# Patient Record
Sex: FEMALE | Race: WHITE | NOT HISPANIC OR LATINO
[De-identification: names, ages, dates, MRNs, and addresses within clinical notes are randomized per-mention and may not be internally consistent; named-entity substitution may affect disease eponyms.]

---

## 2020-02-18 ENCOUNTER — FORM ENCOUNTER (OUTPATIENT)
Age: 75
End: 2020-02-18

## 2021-02-24 ENCOUNTER — APPOINTMENT (OUTPATIENT)
Dept: BREAST CENTER | Facility: CLINIC | Age: 76
End: 2021-02-24

## 2021-03-15 PROBLEM — Z00.00 ENCOUNTER FOR PREVENTIVE HEALTH EXAMINATION: Status: ACTIVE | Noted: 2021-03-15

## 2021-04-05 DIAGNOSIS — Z86.79 PERSONAL HISTORY OF OTHER DISEASES OF THE CIRCULATORY SYSTEM: ICD-10-CM

## 2021-04-05 DIAGNOSIS — N63.10 UNSPECIFIED LUMP IN THE RIGHT BREAST, UNSPECIFIED QUADRANT: ICD-10-CM

## 2021-04-05 DIAGNOSIS — Z80.7 FAMILY HISTORY OF OTHER MALIGNANT NEOPLASMS OF LYMPHOID, HEMATOPOIETIC AND RELATED TISSUES: ICD-10-CM

## 2021-04-05 DIAGNOSIS — Z80.3 FAMILY HISTORY OF MALIGNANT NEOPLASM OF BREAST: ICD-10-CM

## 2021-04-05 DIAGNOSIS — Z87.891 PERSONAL HISTORY OF NICOTINE DEPENDENCE: ICD-10-CM

## 2021-04-07 ENCOUNTER — APPOINTMENT (OUTPATIENT)
Dept: BREAST CENTER | Facility: CLINIC | Age: 76
End: 2021-04-07
Payer: MEDICARE

## 2021-04-07 VITALS
SYSTOLIC BLOOD PRESSURE: 142 MMHG | BODY MASS INDEX: 24.99 KG/M2 | HEIGHT: 65 IN | DIASTOLIC BLOOD PRESSURE: 82 MMHG | WEIGHT: 150 LBS

## 2021-04-07 PROCEDURE — 99072 ADDL SUPL MATRL&STAF TM PHE: CPT

## 2021-04-07 PROCEDURE — 99213 OFFICE O/P EST LOW 20 MIN: CPT

## 2021-04-07 RX ORDER — METOPROLOL TARTRATE 50 MG/1
50 TABLET, FILM COATED ORAL
Refills: 0 | Status: ACTIVE | COMMUNITY

## 2021-04-07 RX ORDER — ATORVASTATIN CALCIUM 10 MG/1
10 TABLET, FILM COATED ORAL
Refills: 0 | Status: ACTIVE | COMMUNITY

## 2021-04-07 RX ORDER — LISINOPRIL 20 MG/1
20 TABLET ORAL
Refills: 0 | Status: ACTIVE | COMMUNITY

## 2021-04-07 NOTE — REVIEW OF SYSTEMS
[Constipation] : constipation [As Noted in HPI] : as noted in HPI [Joint Stiffness] : joint stiffness [Negative] : Heme/Lymph

## 2021-04-07 NOTE — ASSESSMENT
[FreeTextEntry1] : The patient is a 75-year-old nulliparous postmenopausal white female.  She underwent menopause at age 58 and does use Estrace cream.  She has a niece who had breast cancer at age 43.  Her brother had mantle cell lymphoma.  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  She had a right breast 3:00 stable density which has been consistent with an oil cyst which has been followed.  On exam today I cannot feel any suspicious densities in either breast.  She underwent her last mammography and ultrasound at [facility] on April 6, 2021 and I reviewed the films which look unchanged and negative with a stable right breast 3:00 complex cyst and 9:00 intramammary lymph node.  If the official report is unchanged, I would like to see her again in 1 year around April 2022 and her next bilateral mammography and ultrasound will be due at that time.

## 2021-04-07 NOTE — PAST MEDICAL HISTORY
[Postmenopausal] : The patient is postmenopausal [Menarche Age ____] : age at menarche was [unfilled] [Menopause Age____] : age at menopause was [unfilled] [Total Preg ___] : G[unfilled] [History of Hormone Replacement Treatment] : has no history of hormone replacement treatment

## 2021-04-07 NOTE — PHYSICAL EXAM
[Normocephalic] : normocephalic [Atraumatic] : atraumatic [EOMI] : extra ocular movement intact [Supple] : supple [No Supraclavicular Adenopathy] : no supraclavicular adenopathy [No Cervical Adenopathy] : no cervical adenopathy [Examined in the supine and seated position] : examined in the supine and seated position [No dominant masses] : no dominant masses in right breast  [No dominant masses] : no dominant masses left breast [No Nipple Retraction] : no left nipple retraction [No Nipple Discharge] : no left nipple discharge [Breast Mass Right Breast ___cm] : no masses [Breast Mass Left Breast ___cm] : no masses [Breast Nipple Inversion] : nipples not inverted [Breast Nipple Retraction] : nipples not retracted [Breast Nipple Flattening] : nipples not flattened [Breast Nipple Fissures] : nipples not fissured [Breast Abnormal Lactation (Galactorrhea)] : no galactorrhea [Breast Abnormal Secretion Bloody Fluid] : no bloody discharge [Breast Abnormal Secretion Serous Fluid] : no serous discharge [Breast Abnormal Secretion Opalescent Fluid] : no milky discharge [No Axillary Lymphadenopathy] : no left axillary lymphadenopathy [No Edema] : no edema [No Rashes] : no rashes [No Ulceration] : no ulceration [de-identified] : On exam, the patient has ptotic C-cup breasts.  I cannot feel any suspicious densities in either breast.  She has no axillary, supraclavicular, or cervical adenopathy.

## 2021-04-07 NOTE — REASON FOR VISIT
[Follow-Up: _____] : a [unfilled] follow-up visit [FreeTextEntry1] : The patient comes in for routine follow-up with a history of some fibrocystic mastopathy

## 2021-04-07 NOTE — HISTORY OF PRESENT ILLNESS
[FreeTextEntry1] : The patient is a 75-year-old nulliparous postmenopausal white female.  She underwent menopause at age 58 and does use Estrace cream.  She has a niece who had breast cancer at age 43.  Her brother had mantle cell lymphoma.  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  She had a right breast 3:00 stable density which has been consistent with an oil cyst which has been followed.  She comes in for routine yearly exam and continues to get yearly mammography and ultrasound.

## 2021-04-23 ENCOUNTER — TRANSCRIPTION ENCOUNTER (OUTPATIENT)
Age: 76
End: 2021-04-23

## 2022-04-05 ENCOUNTER — RESULT REVIEW (OUTPATIENT)
Age: 77
End: 2022-04-05

## 2022-04-11 ENCOUNTER — RESULT REVIEW (OUTPATIENT)
Age: 77
End: 2022-04-11

## 2022-04-20 ENCOUNTER — APPOINTMENT (OUTPATIENT)
Dept: BREAST CENTER | Facility: CLINIC | Age: 77
End: 2022-04-20
Payer: MEDICARE

## 2022-04-20 VITALS
HEIGHT: 65.5 IN | OXYGEN SATURATION: 98 % | WEIGHT: 150 LBS | DIASTOLIC BLOOD PRESSURE: 92 MMHG | SYSTOLIC BLOOD PRESSURE: 144 MMHG | HEART RATE: 85 BPM | BODY MASS INDEX: 24.69 KG/M2

## 2022-04-20 DIAGNOSIS — R92.2 INCONCLUSIVE MAMMOGRAM: ICD-10-CM

## 2022-04-20 PROCEDURE — 99213 OFFICE O/P EST LOW 20 MIN: CPT

## 2022-04-20 NOTE — HISTORY OF PRESENT ILLNESS
[FreeTextEntry1] : The patient is a 76-year-old nulliparous postmenopausal white female.  She underwent menopause at age 58 and does use Estrace cream.  She has a niece who had breast cancer at age 43.  Her brother had mantle cell lymphoma.  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  She had a right breast 3:00 stable density which has been consistent with an oil cyst which has been followed.  She comes in for routine yearly exam and continues to get yearly mammography and ultrasound.

## 2022-04-20 NOTE — PHYSICAL EXAM
[Normocephalic] : normocephalic [Atraumatic] : atraumatic [EOMI] : extra ocular movement intact [Supple] : supple [No Supraclavicular Adenopathy] : no supraclavicular adenopathy [No Cervical Adenopathy] : no cervical adenopathy [Examined in the supine and seated position] : examined in the supine and seated position [No dominant masses] : no dominant masses in right breast  [No dominant masses] : no dominant masses left breast [No Nipple Retraction] : no left nipple retraction [Breast Mass Right Breast ___cm] : no masses [No Nipple Discharge] : no left nipple discharge [Breast Mass Left Breast ___cm] : no masses [No Axillary Lymphadenopathy] : no right axillary lymphadenopathy [No Edema] : no edema [No Rashes] : no rashes [No Ulceration] : no ulceration [Breast Nipple Inversion] : nipples not inverted [Breast Nipple Retraction] : nipples not retracted [Breast Nipple Flattening] : nipples not flattened [Breast Nipple Fissures] : nipples not fissured [Breast Abnormal Lactation (Galactorrhea)] : no galactorrhea [Breast Abnormal Secretion Bloody Fluid] : no bloody discharge [Breast Abnormal Secretion Serous Fluid] : no serous discharge [Breast Abnormal Secretion Opalescent Fluid] : no milky discharge [de-identified] : On exam, the patient has ptotic C-cup breasts.  I cannot feel any suspicious densities in either breast.  She has no axillary, supraclavicular, or cervical adenopathy.

## 2022-04-20 NOTE — ASSESSMENT
[FreeTextEntry1] : The patient is a 76-year-old nulliparous postmenopausal white female.  She underwent menopause at age 58 and does use Estrace cream.  She has a niece who had breast cancer at age 43.  Her brother had mantle cell lymphoma.  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  She had a right breast 3:00 stable density which has been consistent with an oil cyst which has been followed.  On exam today, I cannot feel any suspicious densities in either breast.  She underwent her last mammography and ultrasound which was reviewed from April 11, 2022 performed at [facility] which was unchanged and negative with a stable right breast 3:00 hypoechoic density.  She was reassured and I would like to see her again in 1 year around April 2023 and her next bilateral mammography and ultrasound will be due at that time and she was given prescriptions.

## 2022-12-08 ENCOUNTER — OFFICE (OUTPATIENT)
Dept: URBAN - METROPOLITAN AREA CLINIC 29 | Facility: CLINIC | Age: 77
Setting detail: OPHTHALMOLOGY
End: 2022-12-08
Payer: MEDICARE

## 2022-12-08 DIAGNOSIS — H25.13: ICD-10-CM

## 2022-12-08 DIAGNOSIS — H02.834: ICD-10-CM

## 2022-12-08 DIAGNOSIS — H02.831: ICD-10-CM

## 2022-12-08 PROCEDURE — 92014 COMPRE OPH EXAM EST PT 1/>: CPT | Performed by: OPHTHALMOLOGY

## 2022-12-08 ASSESSMENT — REFRACTION_MANIFEST
OD_AXIS: 175
OS_VA1: 20/25+
OS_AXIS: 175
OD_SPHERE: PLANO
OS_CYLINDER: +1.25
OD_CYLINDER: +1.25
OD_VA1: 20/20
OS_SPHERE: PLANO

## 2022-12-08 ASSESSMENT — CONFRONTATIONAL VISUAL FIELD TEST (CVF)
OS_FINDINGS: FULL
OD_FINDINGS: FULL

## 2022-12-08 ASSESSMENT — TONOMETRY
OS_IOP_MMHG: 16
OD_IOP_MMHG: 15

## 2022-12-08 ASSESSMENT — REFRACTION_AUTOREFRACTION
OS_AXIS: 004
OS_SPHERE: PLANO
OD_SPHERE: PLANO
OD_CYLINDER: +2.00
OD_AXIS: 006
OS_CYLINDER: +2.25

## 2022-12-08 ASSESSMENT — REFRACTION_CURRENTRX
OS_VPRISM_DIRECTION: SV
OS_SPHERE: +2.75
OD_OVR_VA: 20/
OD_SPHERE: +2.75
OS_OVR_VA: 20/
OD_VPRISM_DIRECTION: SV

## 2022-12-08 ASSESSMENT — VISUAL ACUITY
OD_BCVA: 20/30
OS_BCVA: 20/30-1

## 2022-12-08 ASSESSMENT — LID POSITION - DERMATOCHALASIS
OD_DERMATOCHALASIS: RUL 2+
OS_DERMATOCHALASIS: LUL 2+

## 2023-04-19 ENCOUNTER — RESULT REVIEW (OUTPATIENT)
Age: 78
End: 2023-04-19

## 2023-04-19 ENCOUNTER — APPOINTMENT (OUTPATIENT)
Dept: BREAST CENTER | Facility: CLINIC | Age: 78
End: 2023-04-19
Payer: MEDICARE

## 2023-04-19 VITALS
BODY MASS INDEX: 25.52 KG/M2 | WEIGHT: 155 LBS | SYSTOLIC BLOOD PRESSURE: 135 MMHG | HEART RATE: 83 BPM | TEMPERATURE: 97.4 F | DIASTOLIC BLOOD PRESSURE: 78 MMHG | HEIGHT: 65.5 IN | OXYGEN SATURATION: 98 %

## 2023-04-19 PROCEDURE — 99213 OFFICE O/P EST LOW 20 MIN: CPT

## 2023-04-19 NOTE — HISTORY OF PRESENT ILLNESS
[FreeTextEntry1] : The patient is a 77-year-old nulliparous postmenopausal white female.  She underwent menopause at age 58 and does use Estrace cream.  She has a niece who had breast cancer at age 43.  Her brother had mantle cell lymphoma.  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  She had a right breast 3:00 stable density which has been consistent with an oil cyst which has been followed.  She comes in for routine yearly exam and continues to get yearly mammography and ultrasound.

## 2023-04-19 NOTE — PHYSICAL EXAM
[Normocephalic] : normocephalic [Atraumatic] : atraumatic [EOMI] : extra ocular movement intact [Supple] : supple [No Supraclavicular Adenopathy] : no supraclavicular adenopathy [No Cervical Adenopathy] : no cervical adenopathy [Examined in the supine and seated position] : examined in the supine and seated position [No dominant masses] : no dominant masses in right breast  [No dominant masses] : no dominant masses left breast [No Nipple Retraction] : no left nipple retraction [No Nipple Discharge] : no left nipple discharge [Breast Mass Right Breast ___cm] : no masses [Breast Mass Left Breast ___cm] : no masses [No Axillary Lymphadenopathy] : no left axillary lymphadenopathy [No Edema] : no edema [No Rashes] : no rashes [No Ulceration] : no ulceration [Breast Nipple Inversion] : nipples not inverted [Breast Nipple Retraction] : nipples not retracted [Breast Nipple Flattening] : nipples not flattened [Breast Nipple Fissures] : nipples not fissured [Breast Abnormal Lactation (Galactorrhea)] : no galactorrhea [Breast Abnormal Secretion Bloody Fluid] : no bloody discharge [Breast Abnormal Secretion Serous Fluid] : no serous discharge [Breast Abnormal Secretion Opalescent Fluid] : no milky discharge [de-identified] : On exam, the patient has ptotic C-cup breasts.  I cannot feel any suspicious densities in either breast.  She has no axillary, supraclavicular, or cervical adenopathy.

## 2023-04-19 NOTE — ASSESSMENT
[FreeTextEntry1] : The patient is a 77-year-old nulliparous postmenopausal white female.  She underwent menopause at age 58 and does use Estrace cream.  She has a niece who had breast cancer at age 43.  Her brother had mantle cell lymphoma.  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  She had a right breast 3:00 stable density which has been consistent with an oil cyst which has been followed.  On exam today, I cannot feel any suspicious densities in either breast.  She underwent her last mammography and ultrasound which was reviewed from today on April 19, 2023 performed at [facility] which was unchanged and negative with a stable right breast 3:00 hypoechoic density.  She was reassured and I would like to see her again in 1 year around April 2024 and her next bilateral mammography and ultrasound will be due at that time and she was given prescriptions.

## 2023-12-13 ENCOUNTER — OFFICE (OUTPATIENT)
Dept: URBAN - METROPOLITAN AREA CLINIC 29 | Facility: CLINIC | Age: 78
Setting detail: OPHTHALMOLOGY
End: 2023-12-13
Payer: MEDICARE

## 2023-12-13 DIAGNOSIS — H25.13: ICD-10-CM

## 2023-12-13 DIAGNOSIS — H02.831: ICD-10-CM

## 2023-12-13 DIAGNOSIS — H02.834: ICD-10-CM

## 2023-12-13 PROCEDURE — 92012 INTRM OPH EXAM EST PATIENT: CPT | Performed by: OPHTHALMOLOGY

## 2023-12-13 ASSESSMENT — REFRACTION_AUTOREFRACTION
OS_CYLINDER: +2.25
OS_AXIS: 001
OD_SPHERE: -0.25
OS_SPHERE: -0.50
OD_CYLINDER: +2.00
OD_AXIS: 001

## 2023-12-13 ASSESSMENT — REFRACTION_CURRENTRX
OD_OVR_VA: 20/
OS_OVR_VA: 20/
OS_VPRISM_DIRECTION: SV
OD_VPRISM_DIRECTION: SV
OS_SPHERE: +2.75
OD_SPHERE: +2.75

## 2023-12-13 ASSESSMENT — REFRACTION_MANIFEST
OS_VA1: 20/25+1
OS_AXIS: 175
OD_VA1: 20/20-1
OD_VA1: 20/20
OS_SPHERE: PLANO
OS_CYLINDER: +1.50
OD_SPHERE: -0.25
OD_CYLINDER: +1.25
OD_CYLINDER: +1.50
OD_SPHERE: PLANO
OS_VA1: 20/25+
OD_AXIS: 175
OD_AXIS: 180
OS_CYLINDER: +1.25
OS_SPHERE: -0.25
OS_AXIS: 175

## 2023-12-13 ASSESSMENT — SPHEQUIV_DERIVED
OD_SPHEQUIV: 0.5
OS_SPHEQUIV: 0.5
OS_SPHEQUIV: 0.625
OD_SPHEQUIV: 0.75

## 2023-12-13 ASSESSMENT — LID POSITION - DERMATOCHALASIS
OS_DERMATOCHALASIS: LUL 2+
OD_DERMATOCHALASIS: RUL 2+

## 2024-04-04 ENCOUNTER — NON-APPOINTMENT (OUTPATIENT)
Age: 79
End: 2024-04-04

## 2024-04-19 NOTE — PHYSICAL EXAM
[Normocephalic] : normocephalic [Atraumatic] : atraumatic [EOMI] : extra ocular movement intact [Supple] : supple [No Supraclavicular Adenopathy] : no supraclavicular adenopathy [No Cervical Adenopathy] : no cervical adenopathy [Examined in the supine and seated position] : examined in the supine and seated position [No dominant masses] : no dominant masses in right breast  [No dominant masses] : no dominant masses left breast [No Nipple Retraction] : no left nipple retraction [No Nipple Discharge] : no left nipple discharge [Breast Mass Right Breast ___cm] : no masses [Breast Mass Left Breast ___cm] : no masses [No Axillary Lymphadenopathy] : no left axillary lymphadenopathy [No Edema] : no edema [No Rashes] : no rashes [No Ulceration] : no ulceration [Breast Nipple Inversion] : nipples not inverted [Breast Nipple Retraction] : nipples not retracted [Breast Nipple Flattening] : nipples not flattened [Breast Nipple Fissures] : nipples not fissured [Breast Abnormal Lactation (Galactorrhea)] : no galactorrhea [Breast Abnormal Secretion Bloody Fluid] : no bloody discharge [Breast Abnormal Secretion Serous Fluid] : no serous discharge [Breast Abnormal Secretion Opalescent Fluid] : no milky discharge [de-identified] : On exam, the patient has ptotic C-cup breasts.  I cannot feel any suspicious densities in either breast.  She has no axillary, supraclavicular, or cervical adenopathy.

## 2024-04-19 NOTE — ASSESSMENT
[FreeTextEntry1] : The patient is a 78-year-old nulliparous postmenopausal white female.  She underwent menopause at age 58 and does use Estrace cream.  She has a niece who had breast cancer at age 43.  Her brother had mantle cell lymphoma.  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  She had a right breast 3:00 stable density which has been consistent with an oil cyst which has been followed.  On exam today, I cannot feel any suspicious densities in either breast.  She underwent her last mammography and ultrasound which was reviewed from today on ?????? April 19, 2023 performed at [facility] which was unchanged and negative with a stable right breast 3:00 hypoechoic density.  She was reassured and I would like to see her again in 1 year around April 2025 and her next bilateral mammography and ultrasound will be due at that time and she was given prescriptions.

## 2024-04-19 NOTE — HISTORY OF PRESENT ILLNESS
[FreeTextEntry1] : The patient is a 78-year-old nulliparous postmenopausal white female.  She underwent menopause at age 58 and does use Estrace cream.  She has a niece who had breast cancer at age 43.  Her brother had mantle cell lymphoma.  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  She had a right breast 3:00 stable density which has been consistent with an oil cyst which has been followed.  She comes in for routine yearly exam and continues to get yearly mammography and ultrasound.

## 2024-04-26 ENCOUNTER — RESULT REVIEW (OUTPATIENT)
Age: 79
End: 2024-04-26

## 2024-04-26 ENCOUNTER — APPOINTMENT (OUTPATIENT)
Dept: BREAST CENTER | Facility: CLINIC | Age: 79
End: 2024-04-26
Payer: MEDICARE

## 2024-04-26 VITALS
HEIGHT: 65.5 IN | BODY MASS INDEX: 25.19 KG/M2 | WEIGHT: 153 LBS | SYSTOLIC BLOOD PRESSURE: 150 MMHG | OXYGEN SATURATION: 97 % | HEART RATE: 72 BPM | DIASTOLIC BLOOD PRESSURE: 89 MMHG

## 2024-04-26 DIAGNOSIS — Z12.31 ENCOUNTER FOR SCREENING MAMMOGRAM FOR MALIGNANT NEOPLASM OF BREAST: ICD-10-CM

## 2024-04-26 DIAGNOSIS — N60.12 DIFFUSE CYSTIC MASTOPATHY OF LEFT BREAST: ICD-10-CM

## 2024-04-26 DIAGNOSIS — N60.11 DIFFUSE CYSTIC MASTOPATHY OF LEFT BREAST: ICD-10-CM

## 2024-04-26 PROCEDURE — 99212 OFFICE O/P EST SF 10 MIN: CPT

## 2024-04-26 NOTE — ASSESSMENT
[FreeTextEntry1] : The patient is a 78-year-old nulliparous postmenopausal white female.  She underwent menopause at age 58 and does use Estrace cream.  She has a niece who had breast cancer at age 43.  Her brother had mantle cell lymphoma.  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  She had a right breast 3:00 stable density which has been consistent with an oil cyst which has been followed.  On exam today, I cannot feel any suspicious densities in either breast.  She underwent her last mammography and ultrasound which was reviewed from today on April 26, 2024 performed at [facility] which was unchanged and negative with a stable right breast 3:00 hypoechoic density.  She was reassured and I would like to see her again in 1 year around April 2025 and her next bilateral mammography and ultrasound will be due at that time and she was given prescriptions.

## 2024-04-26 NOTE — PHYSICAL EXAM
[Normocephalic] : normocephalic [Atraumatic] : atraumatic [EOMI] : extra ocular movement intact [Supple] : supple [No Supraclavicular Adenopathy] : no supraclavicular adenopathy [No Cervical Adenopathy] : no cervical adenopathy [Examined in the supine and seated position] : examined in the supine and seated position [No dominant masses] : no dominant masses in right breast  [No dominant masses] : no dominant masses left breast [No Nipple Retraction] : no left nipple retraction [No Nipple Discharge] : no left nipple discharge [Breast Mass Right Breast ___cm] : no masses [Breast Mass Left Breast ___cm] : no masses [No Axillary Lymphadenopathy] : no left axillary lymphadenopathy [No Edema] : no edema [No Rashes] : no rashes [No Ulceration] : no ulceration [Breast Nipple Inversion] : nipples not inverted [Breast Nipple Retraction] : nipples not retracted [Breast Nipple Flattening] : nipples not flattened [Breast Nipple Fissures] : nipples not fissured [Breast Abnormal Lactation (Galactorrhea)] : no galactorrhea [Breast Abnormal Secretion Bloody Fluid] : no bloody discharge [Breast Abnormal Secretion Serous Fluid] : no serous discharge [Breast Abnormal Secretion Opalescent Fluid] : no milky discharge [de-identified] : On exam, the patient has ptotic C-cup breasts.  I cannot feel any suspicious densities in either breast.  She has no axillary, supraclavicular, or cervical adenopathy.

## 2024-10-02 ENCOUNTER — OFFICE (OUTPATIENT)
Dept: URBAN - METROPOLITAN AREA CLINIC 29 | Facility: CLINIC | Age: 79
Setting detail: OPHTHALMOLOGY
End: 2024-10-02

## 2024-10-02 PROCEDURE — MDRCRDRCRD MEDICAL RECORDS: Performed by: OPHTHALMOLOGY

## 2025-02-20 ENCOUNTER — NON-APPOINTMENT (OUTPATIENT)
Age: 80
End: 2025-02-20

## 2025-03-24 ENCOUNTER — NON-APPOINTMENT (OUTPATIENT)
Age: 80
End: 2025-03-24

## 2025-04-30 ENCOUNTER — APPOINTMENT (OUTPATIENT)
Dept: BREAST CENTER | Facility: CLINIC | Age: 80
End: 2025-04-30
Payer: MEDICARE

## 2025-04-30 ENCOUNTER — RESULT REVIEW (OUTPATIENT)
Age: 80
End: 2025-04-30

## 2025-04-30 VITALS
HEART RATE: 71 BPM | HEIGHT: 65.5 IN | BODY MASS INDEX: 24.69 KG/M2 | DIASTOLIC BLOOD PRESSURE: 78 MMHG | WEIGHT: 150 LBS | SYSTOLIC BLOOD PRESSURE: 136 MMHG | OXYGEN SATURATION: 98 %

## 2025-04-30 DIAGNOSIS — N60.12 DIFFUSE CYSTIC MASTOPATHY OF LEFT BREAST: ICD-10-CM

## 2025-04-30 DIAGNOSIS — N60.11 DIFFUSE CYSTIC MASTOPATHY OF LEFT BREAST: ICD-10-CM

## 2025-04-30 DIAGNOSIS — Z12.31 ENCOUNTER FOR SCREENING MAMMOGRAM FOR MALIGNANT NEOPLASM OF BREAST: ICD-10-CM

## 2025-04-30 PROCEDURE — 99213 OFFICE O/P EST LOW 20 MIN: CPT
